# Patient Record
Sex: FEMALE | Race: WHITE | NOT HISPANIC OR LATINO | URBAN - METROPOLITAN AREA
[De-identification: names, ages, dates, MRNs, and addresses within clinical notes are randomized per-mention and may not be internally consistent; named-entity substitution may affect disease eponyms.]

---

## 2017-04-11 DIAGNOSIS — F32.A DEPRESSION, UNSPECIFIED DEPRESSION TYPE: ICD-10-CM

## 2017-04-11 RX ORDER — VENLAFAXINE HYDROCHLORIDE 75 MG/1
CAPSULE, EXTENDED RELEASE ORAL
Qty: 30 CAPSULE | Refills: 6 | Status: SHIPPED | OUTPATIENT
Start: 2017-04-11

## 2020-06-03 ENCOUNTER — HOSPITAL ENCOUNTER (EMERGENCY)
Facility: HOSPITAL | Age: 75
Discharge: HOME | End: 2020-06-03
Attending: EMERGENCY MEDICINE
Payer: MEDICARE

## 2020-06-03 VITALS
DIASTOLIC BLOOD PRESSURE: 67 MMHG | TEMPERATURE: 99 F | SYSTOLIC BLOOD PRESSURE: 145 MMHG | HEIGHT: 61 IN | OXYGEN SATURATION: 97 % | RESPIRATION RATE: 18 BRPM | BODY MASS INDEX: 25.86 KG/M2 | WEIGHT: 137 LBS | HEART RATE: 61 BPM

## 2020-06-03 DIAGNOSIS — S09.90XA INJURY OF HEAD, INITIAL ENCOUNTER: Primary | ICD-10-CM

## 2020-06-03 DIAGNOSIS — S01.01XA SUPERFICIAL LACERATION OF SCALP, INITIAL ENCOUNTER: ICD-10-CM

## 2020-06-03 DIAGNOSIS — S02.2XXB OPEN FRACTURE OF NASAL BONE, INITIAL ENCOUNTER: ICD-10-CM

## 2020-06-03 PROCEDURE — 99284 EMERGENCY DEPT VISIT MOD MDM: CPT | Mod: 25,ER

## 2020-06-03 PROCEDURE — 63600175 PHARM REV CODE 636 W HCPCS: Mod: ER | Performed by: EMERGENCY MEDICINE

## 2020-06-03 PROCEDURE — 25000003 PHARM REV CODE 250: Mod: ER | Performed by: EMERGENCY MEDICINE

## 2020-06-03 PROCEDURE — 96372 THER/PROPH/DIAG INJ SC/IM: CPT | Mod: ER

## 2020-06-03 RX ORDER — TRAMADOL HYDROCHLORIDE AND ACETAMINOPHEN 37.5; 325 MG/1; MG/1
1 TABLET, FILM COATED ORAL EVERY 6 HOURS PRN
Qty: 9 TABLET | Refills: 0 | Status: SHIPPED | OUTPATIENT
Start: 2020-06-03

## 2020-06-03 RX ORDER — KETOROLAC TROMETHAMINE 30 MG/ML
30 INJECTION, SOLUTION INTRAMUSCULAR; INTRAVENOUS
Status: COMPLETED | OUTPATIENT
Start: 2020-06-03 | End: 2020-06-03

## 2020-06-03 RX ADMIN — KETOROLAC TROMETHAMINE 30 MG: 30 INJECTION, SOLUTION INTRAMUSCULAR at 11:06

## 2020-06-03 RX ADMIN — BACITRACIN ZINC NEOMYCIN SULFATE POLYMYXIN B SULFATE: 400; 3.5; 5 OINTMENT TOPICAL at 01:06

## 2020-06-03 NOTE — DISCHARGE INSTRUCTIONS
Call ear nose and throat doctor or plastic surgeon for re-evaluation of your nasal fracture.  Please return to the ED immediately if symptoms return, change or worsen in any way.  Please call your primary doctor today for reevaluation appointment.

## 2020-06-03 NOTE — ED PROVIDER NOTES
Chief Complaint  Chief Complaint   Patient presents with    Head Injury       HPI  Anne Gonzalez is a 74 y.o. female who presents with head injury and bloody nose.  Patient reports that she was getting out of her car and it was wet and she slipped going head 1st into the corner of a wall.  She hit her head and face and had blood coming from her nares.  No loss of consciousness she thinks.  No chest pain or syncope or palpitations or shortness of breath.  No other injuries.  Pain is mild-to-moderate and feels dull.  No exacerbating or relieving factors.  Bleeding stopped spontaneously before arrival    Past medical history  Past Medical History:   Diagnosis Date    Breast cancer        Current Medications  No current facility-administered medications for this encounter.     Current Outpatient Medications:     tramadol-acetaminophen 37.5-325 mg (ULTRACET) 37.5-325 mg Tab, Take 1 tablet by mouth every 6 (six) hours as needed for Pain., Disp: 9 tablet, Rfl: 0    venlafaxine (EFFEXOR-XR) 75 MG 24 hr capsule, TAKE 1 CAPSULE (75 MG TOTAL) BY MOUTH ONCE DAILY., Disp: 30 capsule, Rfl: 6    Allergies  Review of patient's allergies indicates:   Allergen Reactions    Adhesive Rash    Morphine Rash     Other reaction(s): coded    Sulfa (sulfonamide antibiotics) Rash       Surgical history  Past Surgical History:   Procedure Laterality Date    ABDOMINAL SURGERY      Tummy tuck     BREAST LUMPECTOMY      HYSTERECTOMY      REDUCTION OF BOTH BREASTS         Social history  Social History     Socioeconomic History    Marital status:      Spouse name: Not on file    Number of children: Not on file    Years of education: Not on file    Highest education level: Not on file   Occupational History    Not on file   Social Needs    Financial resource strain: Not on file    Food insecurity:     Worry: Not on file     Inability: Not on file    Transportation needs:     Medical: Not on file     Non-medical: Not on  "file   Tobacco Use    Smoking status: Never Smoker    Smokeless tobacco: Never Used   Substance and Sexual Activity    Alcohol use: Never     Frequency: Never    Drug use: Not on file    Sexual activity: Not on file   Lifestyle    Physical activity:     Days per week: Not on file     Minutes per session: Not on file    Stress: Not on file   Relationships    Social connections:     Talks on phone: Not on file     Gets together: Not on file     Attends [employer] service: Not on file     Active member of club or organization: Not on file     Attends meetings of clubs or organizations: Not on file     Relationship status: Not on file   Other Topics Concern    Not on file   Social History Narrative    Not on file       Family History  No family history on file.    Review of systems  Constitutional: No fever or weakness.  Eyes: No redness, pain, or discharge.  HENT: No ear pain, no sudden onset headache, no throat pain.  Respiratory: No wheezing, cough, or shortness of breath.  Cardiovascular: No chest pain or palpitations.  GI: No abdominal pain, nausea, vomiting, or diarrhea.  Neurologic: No new focal weakness or sensory changes.  All systems otherwise negative except as noted in ROS and HPI    Physical Exam  Vital signs: BP (!) 145/67   Pulse 61   Temp 98.5 °F (36.9 °C) (Oral)   Resp 18   Ht 5' 1" (1.549 m)   Wt 62.1 kg (137 lb)   SpO2 97%   BMI 25.89 kg/m²   Constitutional: No acute distress.  Well developed, alert, oriented and appropriate.  HENT: Normocephalic, atraumatic.  Dried blood at the nares.  No significant displacement of nasal fracture.   Eyes: PERRL, EOMI, normal conjunctiva.  Neck: Normal range of motion, no tenderness; supple.  Respiratory: Nonlabored breathing with normal breath sounds.  Cardiovascular: RRR with no pulse deficit.  GI: Soft, nontender, no rebound or guarding.  Musculoskeletal: Normal ROM, no tenderness, injury, or edema.  Skin:  Slight superficial laceration anteriorly " midline to the forehead/scalp junction.  No sutures or staples required at this time.  Wound does not pull apart  Neurologic: Normal motor, sensation with no new focal deficit.  Psychiatric: Affect normal, judgement normal, mood normal.  No SI, HI, and not gravely disabled.    Labs  Pertinent labs reviewed (see chart for details)  Labs Reviewed - No data to display    ECG  No results found for this or any previous visit.  ECG interpreted by ED MD    Radiology  CT Head Without Contrast   Final Result      1. There is an age-indeterminate fracture of the left nasal bone.   2. There is no calvarial fracture or intracranial hemorrhage.   3. There is mild deviation to the left of the nasal septum.   4. 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 There is a small kayleigh bullosa in the left middle nasal turbinate.   All CT scans at this facility use dose modulation, iterative reconstruction, and/or weight base dosing when appropriate to reduce radiation dose when appropriate to reduce radiation dose to as low as reasonably achievable.         Electronically signed by: Jarret Gaxiola MD   Date:    06/03/2020   Time:    13:03          Procedures  Procedures    Medications   ketorolac injection 30 mg (30 mg Intramuscular Given 6/3/20 0777)   neomycin-bacitracin-polymyxin ointment ( Topical (Top) Given 6/3/20 8802)       ED course and medical decision making         Patient is safe for discharge home.  No septal hematoma.  Patient happy and comfortable plan to go home.  She reports she has plenty of physicians to follow up and good contacts    Disposition    Patient discharged in stable condition      Final impression  1. Injury of head, initial encounter    2. Superficial laceration of scalp, initial encounter    3. Open fracture of nasal bone, initial encounter        Critical care time spent with this patient was 0 minutes excluding the procedure time.          Harry Arechiga,  MD  06/03/20 1542

## 2021-01-10 ENCOUNTER — HOSPITAL ENCOUNTER (EMERGENCY)
Dept: HOSPITAL 87 - ER | Age: 76
Discharge: HOME | End: 2021-01-10
Payer: MEDICAID

## 2021-01-10 VITALS — DIASTOLIC BLOOD PRESSURE: 81 MMHG | SYSTOLIC BLOOD PRESSURE: 169 MMHG

## 2021-01-10 VITALS — HEIGHT: 64 IN | BODY MASS INDEX: 36.89 KG/M2 | WEIGHT: 216.05 LBS

## 2021-01-10 DIAGNOSIS — J45.909: Primary | ICD-10-CM

## 2021-01-10 DIAGNOSIS — Z79.899: ICD-10-CM

## 2021-01-10 DIAGNOSIS — I10: ICD-10-CM

## 2021-01-10 LAB
BASOPHILS NFR BLD AUTO: 0.6 % (ref 0–2)
CHLORIDE SERPL-SCNC: 106 MEQ/L (ref 98–107)
EOSINOPHIL NFR BLD AUTO: 8.9 % (ref 0–5)
ERYTHROCYTE [DISTWIDTH] IN BLOOD BY AUTOMATED COUNT: 15.3 % (ref 11.6–14.6)
HCT VFR BLD AUTO: 39.2 % (ref 36–48)
HGB BLD-MCNC: 13 G/DL (ref 12–16)
LYMPHOCYTES NFR BLD AUTO: 20 % (ref 20–50)
MCH RBC QN AUTO: 29.4 PG (ref 28–32)
MCV RBC AUTO: 88.9 FL (ref 81–99)
MONOCYTES NFR BLD AUTO: 8.2 % (ref 2–8)
NEUTROPHILS NFR BLD AUTO: 62.3 % (ref 40–76)
PLATELET # BLD AUTO: 315 X1000/UL (ref 130–400)
PMV BLD AUTO: 8.9 FL (ref 7.4–10.4)
RBC # BLD AUTO: 4.41 MILL/UL (ref 4.2–5.4)

## 2021-01-10 PROCEDURE — 93005 ELECTROCARDIOGRAM TRACING: CPT

## 2021-01-10 PROCEDURE — 83880 ASSAY OF NATRIURETIC PEPTIDE: CPT

## 2021-01-10 PROCEDURE — 99285 EMERGENCY DEPT VISIT HI MDM: CPT

## 2021-01-10 PROCEDURE — 80053 COMPREHEN METABOLIC PANEL: CPT

## 2021-01-10 PROCEDURE — 36415 COLL VENOUS BLD VENIPUNCTURE: CPT

## 2021-01-10 PROCEDURE — 84484 ASSAY OF TROPONIN QUANT: CPT

## 2021-01-10 PROCEDURE — 85025 COMPLETE CBC W/AUTO DIFF WBC: CPT

## 2021-01-10 PROCEDURE — 71045 X-RAY EXAM CHEST 1 VIEW: CPT
